# Patient Record
Sex: FEMALE | Race: WHITE | Employment: FULL TIME | URBAN - NONMETROPOLITAN AREA
[De-identification: names, ages, dates, MRNs, and addresses within clinical notes are randomized per-mention and may not be internally consistent; named-entity substitution may affect disease eponyms.]

---

## 2017-09-18 ENCOUNTER — HOSPITAL ENCOUNTER (OUTPATIENT)
Dept: LAB | Age: 42
Discharge: HOME | End: 2017-09-18
Attending: NURSE PRACTITIONER | Admitting: NURSE PRACTITIONER

## 2017-09-18 LAB
FERRITIN: 67 NG/ML (ref 15–150)
T3 FREE: 2.9 PG/ML (ref 2.3–4.2)
T4 FREE: 1.08 NG/DL (ref 0.9–1.8)
TSH HIGH SENSITIVITY: 0.01 UIU/ML (ref 0.27–4.2)
VITAMIN D 25-HYDROXY: 37.32 NG/ML

## 2017-09-19 LAB
ANTITHYROGLOBULIN AB: <0.9
ANTITHYROID MICORSOMAL: 0.4
T3 TOTAL: 92 NG/DL
T4 TOTAL: 6.02 UG/DL

## 2017-09-22 LAB — T3 REVERSE: 13.1

## 2021-08-27 ENCOUNTER — OFFICE VISIT (OUTPATIENT)
Dept: INTERNAL MEDICINE CLINIC | Age: 46
End: 2021-08-27
Payer: COMMERCIAL

## 2021-08-27 VITALS
OXYGEN SATURATION: 98 % | TEMPERATURE: 99.1 F | HEIGHT: 65 IN | BODY MASS INDEX: 36.65 KG/M2 | RESPIRATION RATE: 16 BRPM | WEIGHT: 220 LBS | HEART RATE: 89 BPM

## 2021-08-27 DIAGNOSIS — R05.9 COUGH: Primary | ICD-10-CM

## 2021-08-27 PROCEDURE — 99213 OFFICE O/P EST LOW 20 MIN: CPT | Performed by: NURSE PRACTITIONER

## 2021-08-27 NOTE — PROGRESS NOTES
8/27/2021    HPI:  Chief complaint and history of present illness as per medical assistant/nurse documented today in the Flu/COVID-19 clinic. She is being seen today through the [facility]. PCP:  Sheyla Badillo CNP    She c/o a 4 day history of productive cough of clear sputum, clear nasal drainage, sinus pressure, sore throat, loss of smell and fatigue. Reports that her  tested positive for COVID-19 yesterday. Denies history other than obesity, and hypothyroidism. Denies sob, difficulty breathing, sob, abdominal pain, n/v/d, fever, chills. No distress. MEDICATIONS:  Prior to Visit Medications    Not on File     Pulse 89   Temp 99.1 °F (37.3 °C) (Infrared)   Resp 16   Ht 5' 5\" (1.651 m)   Wt 220 lb (99.8 kg)   SpO2 98% Comment: RA  BMI 36.61 kg/m²       PHYSICAL EXAM:  Physical Exam  Vitals and nursing note reviewed. Constitutional:       General: She is not in acute distress. Appearance: Normal appearance. She is well-developed. She is not ill-appearing, toxic-appearing or diaphoretic. HENT:      Head: Normocephalic and atraumatic. Right Ear: Tympanic membrane, ear canal and external ear normal.      Left Ear: Tympanic membrane, ear canal and external ear normal.      Nose: Congestion present. Mouth/Throat:      Mouth: Mucous membranes are moist.      Pharynx: No oropharyngeal exudate or posterior oropharyngeal erythema. Eyes:      Extraocular Movements: Extraocular movements intact. Conjunctiva/sclera: Conjunctivae normal.      Pupils: Pupils are equal, round, and reactive to light. Neck:      Thyroid: No thyroid mass or thyromegaly. Trachea: Trachea normal.   Cardiovascular:      Rate and Rhythm: Normal rate and regular rhythm. Pulses: Normal pulses. Heart sounds: S1 normal and S2 normal.   Pulmonary:      Effort: Pulmonary effort is normal. No accessory muscle usage or respiratory distress. Breath sounds: Normal breath sounds.  No stridor. No wheezing, rhonchi or rales. Chest:      Chest wall: No tenderness. Abdominal:      General: Bowel sounds are normal.      Palpations: Abdomen is soft. Musculoskeletal:         General: Normal range of motion. Right shoulder: No swelling, deformity, tenderness, bony tenderness or crepitus. Normal range of motion. Cervical back: Full passive range of motion without pain, normal range of motion and neck supple. No rigidity. No muscular tenderness. Lymphadenopathy:      Cervical: No cervical adenopathy. Skin:     General: Skin is warm and dry. Coloration: Skin is not pale. Findings: No erythema or rash. Nails: There is no clubbing. Neurological:      Mental Status: She is alert and oriented to person, place, and time. Psychiatric:         Mood and Affect: Mood normal.         Speech: Speech normal.         Behavior: Behavior normal.         Thought Content: Thought content normal.         Judgment: Judgment normal.         ASSESSMENT/PLAN:    1. Cough  Discussed that she would be a candidate of monoclonal antibodies due to her history of obesity and education provided in verbally and in writing. BMI 36.61. She is considering this outpatient therapy if positive. - Encourage clear fluids without caffeine to ensure hydration.  - Smaller, more frequent meals    - Saline nasal spray, cool mist humidifier, prop head at night for congestion.   - Tylenol as needed for fever, pain. - Counseled on signs of increased work of breathing to seek emergency treatment  - Follow-up with PCP as needed. - Covid-19 Ambulatory; Future  - Covid-19 Ambulatory      FOLLOW-UP:  Return if symptoms worsen or fail to improve.     In addition to other information, the printed after visit summary provided to the patient includes:  [x] COVID-19 Self care instructions  [x] COVID-19 General patient information

## 2021-08-27 NOTE — PATIENT INSTRUCTIONS
Your COVID 19 test can take 1-5 days for the results to come back. We ask that you make a Mychart page and view your test results this way. You will need to Self quarantine until you know your results. Increase fluids and rest  Saline nasal spray as needed for nasal congestion  Warm salt gargles as needed for throat discomfort  Monitor temperature twice a day  Tylenol as needed for fevers and/or discomfort. Big deep breaths periodically throughout the day  Regular Mucinex over the counter as needed for chest congestion  If symptoms worsen -Go to the ER. Follow up with your primary care provider      To Whom it May Concern:    Vignesh Hall was tested for COVID-19 8/27/2021. He/she must stay home until test results are back. If test is positive, he/she must quarantine for a total of 10 days starting from day one of symptom onset. He/she must also be fever-free for 24 hours at that time, and also have improvement in symptoms. We do not recommend retesting as patients may continue to test positive for months even though no longer contagious. It is suggested you call [address] or [address] with any questions regarding quarantine timeframe/return to work/school details. Steps to help prevent the spread of COVID-19 if you are sick  SOURCE - https://naomy-john.info/. html     Stay home except to get medical care   ; Stay home: People who are mildly ill with COVID-19 are able to isolate at home during their illness. You should restrict activities outside your home, except for getting medical care.   ; Avoid public areas: Do not go to work, school, or public areas.   ; Avoid public transportation: Avoid using public transportation, ride-sharing, or taxis.  ; Separate yourself from other people and animals in your home   ; Stay away from others: As much as possible, you should stay in a specific room and away from other people in your home.  Also, you should use a separate bathroom, if available.   ; Limit contact with pets & animals: You should restrict contact with pets and other animals while you are sick with COVID-19, just like you would around other people. Although there have not been reports of pets or other animals becoming sick with COVID-19, it is still recommended that people sick with COVID-19 limit contact with animals until more information is known about the virus. ; When possible, have another member of your household care for your animals while you are sick. If you are sick with COVID-19, avoid contact with your pet, including petting, snuggling, being kissed or licked, and sharing food. If you must care for your pet or be around animals while you are sick, wash your hands before and after you interact with pets and wear a facemask. See COVID-19 and Animals for more information. Other considerations   The ill person should eat/be fed in their room if possible. Non-disposable  items used should be handled with gloves and washed with hot water or in a . Clean hands after handling used  items.  If possible, dedicate a lined trash can for the ill person. Use gloves when removing garbage bags, handling, and disposing of trash. Wash hands after handling or disposing of trash.  Consider consulting with your local health department about trash disposal guidance if available. Information for Household Members and Caregivers of Someone who is Sick   Call ahead before visiting your doctor   Call ahead: If you have a medical appointment, call the healthcare provider and tell them that you have or may have COVID-19. This will help the healthcare provider's office take steps to keep other people from getting infected or exposed. Wear a facemask if you are sick   ;  If you are sick: You should wear a facemask when you are around other people (e.g., sharing a room or vehicle) or pets and before you enter a healthcare provider's office. ; If you are caring for others: If the person who is sick is not able to wear a facemask (for example, because it causes trouble breathing), then people who live with the person who is sick should not stay in the same room with them, or they should wear a facemask if they enter a room with the person who is sick. Cover your coughs and sneezes   ; Cover: Cover your mouth and nose with a tissue when you cough or sneeze.   ; Dispose: Throw used tissues in a lined trash can.   ; Wash hands: Immediately wash your hands with soap and water for at least 20 seconds or, if soap and water are not available, clean your hands with an alcohol-based hand  that contains at least 60% alcohol. Clean your hands often   ; Wash hands: Wash your hands often with soap and water for at least 20 seconds, especially after blowing your nose, coughing, or sneezing; going to the bathroom; and before eating or preparing food.   ; Hand : If soap and water are not readily available, use an alcohol-based hand  with at least 60% alcohol, covering all surfaces of your hands and rubbing them together until they feel dry.   ; Soap and water: Soap and water are the best option if hands are visibly dirty.   ; Avoid touching: Avoid touching your eyes, nose, and mouth with unwashed hands. Handwashing Tips   ; Wet your hands with clean, running water (warm or cold), turn off the tap, and apply soap.  ; Lather your hands by rubbing them together with the soap. Lather the backs of your hands, between your fingers, and under your nails. ; Scrub your hands for at least 20 seconds. Need a timer? Hum the Cavalier from beginning to end twice.  ; Rinse your hands well under clean, running water.  ; Dry your hands using a clean towel or air dry them. Avoid sharing personal household items   ; Do not share:  You should not share dishes, drinking glasses, cups, eating utensils, towels, or bedding with other people or pets in your home.   ; Wash thoroughly after use: After using these items, they should be washed thoroughly with soap and water. Clean all high-touch surfaces everyday   ; Clean and disinfect: Practice routine cleaning of high touch surfaces.  ; High touch surfaces include counters, tabletops, doorknobs, bathroom fixtures, toilets, phones, keyboards, tablets, and bedside tables.  ; Disinfect areas with bodily fluids: Also, clean any surfaces that may have blood, stool, or body fluids on them.   ; Household : Use a household cleaning spray or wipe, according to the label instructions. Labels contain instructions for safe and effective use of the cleaning product including precautions you should take when applying the product, such as wearing gloves and making sure you have good ventilation during use of the product. Monitor your symptoms   Seek medical attention: Seek prompt medical attention if your illness is worsening     (e.g., difficulty breathing).   ; Call your doctor: Before seeking care, call your healthcare provider and tell them that you have, or are being evaluated for, COVID-19.   ; Wear a facemask when sick: Put on a facemask before you enter the facility. These steps will help the healthcare provider's office to keep other people in the office or waiting room from getting infected or exposed. ; Alert health department: Ask your healthcare provider to call the local or state health department. Persons who are placed under active monitoring or facilitated self-monitoring should follow instructions provided by their local health department or occupational health professionals, as appropriate.  ; Call 911 if you have a medical emergency: If you have a medical emergency and need to call 911, notify the dispatch personnel that you have, or are being evaluated for COVID-19. If possible, put on a facemask before emergency medical services arrive.

## 2021-08-27 NOTE — PROGRESS NOTES
8/27/21  Homer Holt  1975    FLU/COVID-19 CLINIC EVALUATION    HPI SYMPTOMS:    Employer:    [] Fevers  [] Chills  [x] Cough  [] Coughing up blood  [] Chest Congestion  [x] Nasal Congestion  [] Feeling short of breath  [] Sometimes  [] Frequently  [] All the time  [] Chest pain  [] Headaches  []Tolerable  [] Severe  [x] Sore throat  [] Muscle aches  [] Nausea  [] Vomiting  []Unable to keep fluids down  [] Diarrhea  []Severe    [x] OTHER SYMPTOMS:      Symptom Duration:   [] 1  [] 2   [] 3   [x] 4    [] 5   [] 6   [] 7   [] 8   [] 9   [] 10   [] 11   [] 12   [] 13   [] 14   [] Longer than 14 days    Symptom course:   [] Worsening     [] Stable     [x] Improving    RISK FACTORS:    [] Pregnant or possibly pregnant  [] Age over 61  [] Diabetes  [] Heart disease  [] Asthma  [] COPD/Other chronic lung diseases  [] Active Cancer  [] On Chemotherapy  [] Taking oral steroids  [] History Lymphoma/Leukemia  [x] Close contact with a lab confirmed COVID-19 patient within 14 days of symptom onset  [] History of travel from affected geographical areas within 14 days of symptom onset       VITALS:  There were no vitals filed for this visit. TESTS:    POCT FLU:  [] Positive     []Negative    ASSESSMENT:    [] Flu  [] Possible COVID-19  [] Strep    PLAN:    [] Discharge home with written instructions for:  [] Flu management  [] Possible COVID-19 infection with self-quarantine and management of symptoms  [] Follow-up with primary care physician or emergency department if worsens  [] Evaluation per physician/NP/PA in clinic  [] Sent to ER       An  electronic signature was used to authenticate this note.      --Max Angel on 8/27/2021 at 10:41 AM

## 2021-08-28 LAB — SARS-COV-2: DETECTED

## 2021-08-29 NOTE — RESULT ENCOUNTER NOTE
I have called the patient and reviewed her positive covid result with her. Discussed the outpatient monoclonal antibody treatment with her and she declines at this time. She feels that she is still having mild symptoms. Education provided that she is at risk for sever COVID based on her BMI, and that if she changes her mind that she can contact the office and we will place the order. She verbalizes understanding.

## 2022-01-03 ENCOUNTER — OFFICE VISIT (OUTPATIENT)
Dept: INTERNAL MEDICINE CLINIC | Age: 47
End: 2022-01-03
Payer: COMMERCIAL

## 2022-01-03 VITALS — HEART RATE: 98 BPM | OXYGEN SATURATION: 97 % | TEMPERATURE: 97.7 F

## 2022-01-03 DIAGNOSIS — J01.00 ACUTE NON-RECURRENT MAXILLARY SINUSITIS: Primary | ICD-10-CM

## 2022-01-03 PROCEDURE — 99213 OFFICE O/P EST LOW 20 MIN: CPT | Performed by: NURSE PRACTITIONER

## 2022-01-03 RX ORDER — AMOXICILLIN 875 MG/1
875 TABLET, COATED ORAL 2 TIMES DAILY
Qty: 20 TABLET | Refills: 0 | Status: SHIPPED | OUTPATIENT
Start: 2022-01-03 | End: 2022-01-13

## 2022-01-03 RX ORDER — METHYLPREDNISOLONE 4 MG/1
TABLET ORAL
Qty: 1 KIT | Refills: 0 | Status: SHIPPED | OUTPATIENT
Start: 2022-01-03 | End: 2022-01-09

## 2022-01-03 NOTE — PATIENT INSTRUCTIONS
Your COVID 19 test can take 1-5 days for the results to come back. We ask that you make a Mychart page and view your test results this way. You will need to Self quarantine until you know your results. Increase fluids and rest  Saline nasal spray as needed for nasal congestion  Warm salt gargles as needed for throat discomfort  Monitor temperature twice a day  Tylenol as needed for fevers and/or discomfort. Big deep breaths periodically throughout the day  Regular Mucinex over the counter as needed for chest congestion  If symptoms worsen -Go to the ER. Follow up with your primary care provider      To Whom it May Concern:    Lisa Verma was tested for COVID-19 1/3/2022. He/she must stay home until test results are back. If test is positive, he/she must quarantine for a total of 10 days starting from day one of symptom onset. He/she must also be fever-free for 24 hours at that time, and also have improvement in symptoms. CDC guidelines, however, are often changing and quarantine guidelines may be based on type of occupation. We do not recommend retesting as patients may continue to test positive for months even though no longer contagious. It is suggested you call [address] or [address] [place] [address] with any questions regarding quarantine timeframe/return to work/school details.

## 2022-01-03 NOTE — PROGRESS NOTES
Subjective:       Anibal Chin is a 55 y.o. female who presents for evaluation of possible sinus infection. Symptoms include bilateral ear pressure/pain, congestion, nasal congestion, productive cough with  green colored sputum, sinus pressure and tooth pain with no fever, chills, night sweats or weight loss. Onset of symptoms was 2 weeks ago, gradually worsening since that time. She is drinking plenty of fluids. Past history is significant for no history of pneumonia or bronchitis. Patient is a non-smoker. Patient's medications, allergies, past medical, surgical, social and family histories were reviewed and updated as appropriate. Review of Systems  Pertinent items are noted in HPI. Objective:      Pulse 98   Temp 97.7 °F (36.5 °C)   SpO2 97%   General appearance: alert, appears stated age and cooperative  Head: Normocephalic, without obvious abnormality, atraumatic  Ears: normal TM and external ear canal right ear and normal TM and external ear canal left ear bulging of bilateral TM  Throat: lips, mucosa, and tongue normal; teeth and gums normal  Lungs: clear to auscultation bilaterally      Assessment:      Acute bacterial sinusitis      Plan:      1. Claritin D  2. Amoxicillin  3. Nasal saline rinses as needed for congestion. 4. Follow-up with PCP in 1 week if symptoms worsen or persist.       1. Acute non-recurrent maxillary sinusitis  Pt having green sputum production nasally and when coughing. Teeth hurt, ear pressure and post nasal drip. - amoxicillin (AMOXIL) 875 MG tablet; Take 1 tablet by mouth 2 times daily for 10 days  Dispense: 20 tablet; Refill: 0  - methylPREDNISolone (MEDROL DOSEPACK) 4 MG tablet; Take by mouth. Dispense: 1 kit;  Refill: 0

## 2022-01-03 NOTE — PROGRESS NOTES
1/3/22  Erica Holt  1975    FLU/COVID-19 CLINIC EVALUATION    HPI SYMPTOMS:    Employer:    [] Fevers  [] Chills  [x] Cough  [] Coughing up blood  [x] Chest Congestion  [x] Nasal Congestion  [] Feeling short of breath  [] Sometimes  [] Frequently  [] All the time  [] Chest pain  [] Headaches  []Tolerable  [] Severe  [x] Sore throat  [] Muscle aches  [] Nausea  [] Vomiting  []Unable to keep fluids down  [] Diarrhea  []Severe    [x] OTHER SYMPTOMS:  Ear paiin  Symptom Duration:   [] 1  [] 2   [] 3   [] 4    [] 5   [] 6   [] 7   [] 8   [] 9   [] 10   [] 11   [] 12   [] 13   [] 14   [x] Longer than 14 days    Symptom course:   [x] Worsening     [] Stable     [] Improving    RISK FACTORS:    [] Pregnant or possibly pregnant  [] Age over 61  [] Diabetes  [] Heart disease  [] Asthma  [] COPD/Other chronic lung diseases  [] Active Cancer  [] On Chemotherapy  [] Taking oral steroids  [] History Lymphoma/Leukemia  [] Close contact with a lab confirmed COVID-19 patient within 14 days of symptom onset  [] History of travel from affected geographical areas within 14 days of symptom onset       VITALS:  There were no vitals filed for this visit. TESTS:    POCT FLU:  [] Positive     []Negative    ASSESSMENT:    [] Flu  [] Possible COVID-19  [] Strep    PLAN:    [] Discharge home with written instructions for:  [] Flu management  [] Possible COVID-19 infection with self-quarantine and management of symptoms  [] Follow-up with primary care physician or emergency department if worsens  [] Evaluation per physician/NP/PA in clinic  [] Sent to ER       An  electronic signature was used to authenticate this note.      --Nathaniel Chavarria MA on 1/3/2022 at 3:12 PM

## 2022-07-29 ENCOUNTER — OFFICE VISIT (OUTPATIENT)
Dept: INTERNAL MEDICINE CLINIC | Age: 47
End: 2022-07-29
Payer: COMMERCIAL

## 2022-07-29 ENCOUNTER — HOSPITAL ENCOUNTER (OUTPATIENT)
Age: 47
Discharge: HOME | End: 2022-07-29
Payer: COMMERCIAL

## 2022-07-29 VITALS
BODY MASS INDEX: 36.13 KG/M2 | WEIGHT: 224.8 LBS | OXYGEN SATURATION: 98 % | DIASTOLIC BLOOD PRESSURE: 62 MMHG | HEIGHT: 66 IN | HEART RATE: 101 BPM | SYSTOLIC BLOOD PRESSURE: 110 MMHG

## 2022-07-29 DIAGNOSIS — E89.0 POST-SURGICAL HYPOTHYROIDISM: Primary | ICD-10-CM

## 2022-07-29 DIAGNOSIS — E55.9 VITAMIN D DEFICIENCY: ICD-10-CM

## 2022-07-29 DIAGNOSIS — E78.5 DYSLIPIDEMIA: ICD-10-CM

## 2022-07-29 DIAGNOSIS — Z12.11 COLON CANCER SCREENING: ICD-10-CM

## 2022-07-29 DIAGNOSIS — Z72.0 TOBACCO USE: ICD-10-CM

## 2022-07-29 LAB
ALBUMIN SERPL-MCNC: 4.6 GM/DL (ref 3.4–5)
ALP BLD-CCNC: 119 IU/L (ref 40–129)
ALT SERPL-CCNC: 13 U/L (ref 10–40)
ANION GAP SERPL CALCULATED.3IONS-SCNC: 13 MMOL/L (ref 4–16)
AST SERPL-CCNC: 17 IU/L (ref 15–37)
BASOPHILS ABSOLUTE: 0.1 K/CU MM
BASOPHILS RELATIVE PERCENT: 0.8 % (ref 0–1)
BILIRUB SERPL-MCNC: 0.2 MG/DL (ref 0–1)
BUN BLDV-MCNC: 9 MG/DL (ref 6–23)
CALCIUM SERPL-MCNC: 9.1 MG/DL (ref 8.3–10.6)
CHLORIDE BLD-SCNC: 103 MMOL/L (ref 99–110)
CHOLESTEROL, FASTING: 187 MG/DL
CO2: 24 MMOL/L (ref 21–32)
CREAT SERPL-MCNC: 0.7 MG/DL (ref 0.6–1.1)
DIFFERENTIAL TYPE: ABNORMAL
EOSINOPHILS ABSOLUTE: 0.3 K/CU MM
EOSINOPHILS RELATIVE PERCENT: 2.6 % (ref 0–3)
GFR AFRICAN AMERICAN: >60 ML/MIN/1.73M2
GFR NON-AFRICAN AMERICAN: >60 ML/MIN/1.73M2
GLUCOSE FASTING: 90 MG/DL (ref 70–99)
HCT VFR BLD CALC: 43.9 % (ref 37–47)
HDLC SERPL-MCNC: 48 MG/DL
HEMOGLOBIN: 13.9 GM/DL (ref 12.5–16)
IMMATURE NEUTROPHIL %: 1.3 % (ref 0–0.43)
LDL CHOLESTEROL CALCULATED: 117 MG/DL
LYMPHOCYTES ABSOLUTE: 2 K/CU MM
LYMPHOCYTES RELATIVE PERCENT: 15.4 % (ref 24–44)
MCH RBC QN AUTO: 28.6 PG (ref 27–31)
MCHC RBC AUTO-ENTMCNC: 31.7 % (ref 32–36)
MCV RBC AUTO: 90.3 FL (ref 78–100)
MONOCYTES ABSOLUTE: 0.8 K/CU MM
MONOCYTES RELATIVE PERCENT: 6.4 % (ref 0–4)
PDW BLD-RTO: 14.3 % (ref 11.7–14.9)
PLATELET # BLD: 413 K/CU MM (ref 140–440)
PMV BLD AUTO: 10.7 FL (ref 7.5–11.1)
POTASSIUM SERPL-SCNC: 4.9 MMOL/L (ref 3.5–5.1)
RBC # BLD: 4.86 M/CU MM (ref 4.2–5.4)
SEGMENTED NEUTROPHILS ABSOLUTE COUNT: 9.5 K/CU MM
SEGMENTED NEUTROPHILS RELATIVE PERCENT: 73.5 % (ref 36–66)
SODIUM BLD-SCNC: 140 MMOL/L (ref 135–145)
T4 FREE: 0.64 NG/DL (ref 0.9–1.8)
TOTAL IMMATURE NEUTOROPHIL: 0.17 K/CU MM
TOTAL PROTEIN: 7.2 GM/DL (ref 6.4–8.2)
TRIGLYCERIDE, FASTING: 111 MG/DL
TSH HIGH SENSITIVITY: 26.08 UIU/ML (ref 0.27–4.2)
VITAMIN D 25-HYDROXY: 26.39 NG/ML
WBC # BLD: 12.9 K/CU MM (ref 4–10.5)

## 2022-07-29 PROCEDURE — 85025 COMPLETE CBC W/AUTO DIFF WBC: CPT

## 2022-07-29 PROCEDURE — 99214 OFFICE O/P EST MOD 30 MIN: CPT | Performed by: INTERNAL MEDICINE

## 2022-07-29 PROCEDURE — 80053 COMPREHEN METABOLIC PANEL: CPT

## 2022-07-29 PROCEDURE — 84443 ASSAY THYROID STIM HORMONE: CPT

## 2022-07-29 PROCEDURE — 80061 LIPID PANEL: CPT

## 2022-07-29 PROCEDURE — 84439 ASSAY OF FREE THYROXINE: CPT

## 2022-07-29 PROCEDURE — 82306 VITAMIN D 25 HYDROXY: CPT

## 2022-07-29 PROCEDURE — 36415 COLL VENOUS BLD VENIPUNCTURE: CPT

## 2022-07-29 NOTE — PROGRESS NOTES
Establishing care with a new PCP and needs to be referred to a new Endocrinologist. Has not had Thyroid medication in about 1-2 years and needs blood work done. Patient takes [facility].

## 2022-07-29 NOTE — PROGRESS NOTES
7/29/22    Addi Holt  1975    Chief Complaint   Patient presents with    New Patient       History of Present Illness:  Alexandro Molina is a 52 y.o. pleasant lady presenting today to establish care as a new patient with a chief complaint of surgical hypothyroidism. 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 She is off Synthroid due to LTFU. Has been taking OTC supplements. # Patient reports a prior physician placed her on meds for fibromyalgia due to foot pain. # Wants to quit smoking on her own.        Health maintenance:   Health Maintenance Due   Topic Date Due    COVID-19 Vaccine (1) Never done    Depression Screen  Never done    HIV screen  Never done    Hepatitis C screen  Never done    DTaP/Tdap/Td vaccine (1 - Tdap) Never done    Cervical cancer screen  Never done    Diabetes screen  Never done    Lipids  Never done    Colorectal Cancer Screen  Never done         Review of Systems:  Constitutional: no fevers, no chills, weight gain  Pain assessment: no pain  Head: no headaches  Ears: no hearing loss, no tinnitus, no vertigo  Eyes: no blurry vision, no diplopia, no dryness, no itchiness  Mouth: no oral ulcers, no dry mouth, no sore throat  Nose: no nasal congestion, no epistaxis  Cardiac: no chest pain, no palpitations, no leg swelling, no orthopnea, no PND, no syncope  Pulmonary: no dyspnea, no cough, no wheezing, no hemoptysis  GI: no nausea, no vomiting, no diarrhea, no constipation, no abdominal pain, no hematochezia  : no dysuria, no frequency, no urgency, no hematuria, no frothy urine, no dyspareunia, no pelvic pain, no vaginal bleeding, no abnormal vaginal discharge  MSK: no arthralgias, no myalgias, no early morning stiffness, no Raynaud's   Neuro: no focal neurological deficits, no seizures  Sleep: no snoring, no daytime somnolence   Psych: no depression, no anxiety, no suicidal ideation      Physical Exam:  VITALS:   /62 (Site: Left Upper Arm, Position: Sitting, Cuff Size: Large Adult)   Pulse (!) 101   Ht 5' 6\" (1.676 m)   Wt 224 lb 12.8 oz (102 kg)   SpO2 98%   BMI 36.28 kg/m²     PHYSICAL EXAMINATION:  General: alert, awake, and oriented to time, place, person, and situation. Not in acute distress   Skin:  no suspicious rashes, no jaundice  Head: normocephalic/atraumatic  Eyes: anicteric sclera, well-injected conjunctiva. Pupils are equally round and reactive to light. Extraocular movements are intact   Nose: no septal deviation evident  Sinuses: no sinus tenderness  Ears: external ears normal  Neck: supple, no cervical lymphadenopathy, thyroid symmetric and not enlarged, no bruits   Heart: regular rate and rhythm, regular S1/S2, no S3/S4, no audible murmurs, no audible friction rub  Lungs: clear to auscultation bilaterally, no audible crackles, no audible wheezes, no audible rhonchi    Abdomen: normal bowel sounds, soft abdomen, non-tender, no palpable masses  Extremities: no edema, warm, no cyanosis, no clubbing. Good capillary refill   MSK: no tenderness across spinous processes, full ROM in all 4 extremities.  No joint swelling or tenderness   Peripheral vascular: 2+ pulses symmetric (radial)  Neuro: gait normal, CN II-XII intact, motor power 5/5 in all 4 extremities, sensation intact and symmetric    Labs   I have personally reviewed labs, and discussed pertinent findings with patient on this date 7/29/2022     Imaging   I have personally reviewed imaging, and discussed pertinent findings with patient on this date 7/29/2022     Other notes  I have personally reviewed other notes, and discussed pertinent findings with patient on this date 7/29/2022 Assessment/Plan:     1. 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 Dyslipidemia  - CBC with Auto Differential; Future  - Comprehensive Metabolic Panel; Future  - Lipid, Fasting; Future    3. Vitamin D deficiency  - Vitamin D 25 Hydroxy; Future    4. Tobacco use  Smoking cessation strongly encouraged  Patient wants to quit cold turkey and declined my help at the moment    5. Colon cancer screening  - Fecal DNA Colorectal cancer screening (Cologuard)      Care discussed with patient and questions answered. Patient verbalizes understanding and agrees with plan. Discussed with patient the importance of continuity of care. I encouraged patient to schedule next appointment within 2 months with me. Patient prefers to be reached by Phone call at [phone] for future medical correspondence. Encouraged to activate [employer]. I reviewed and reconciled the medications this visit. I reviewed and updated the past medical, surgical, social, and family history during this visit. After visit summary provided.        Rd Erickson MD  Internal Medicine  7/29/2022   11:18 AM

## 2022-08-01 ENCOUNTER — PATIENT MESSAGE (OUTPATIENT)
Dept: INTERNAL MEDICINE CLINIC | Age: 47
End: 2022-08-01

## 2022-08-01 DIAGNOSIS — E89.0 POST-SURGICAL HYPOTHYROIDISM: Primary | ICD-10-CM

## 2022-08-04 RX ORDER — LEVOTHYROXINE SODIUM 0.05 MG/1
50 TABLET ORAL DAILY
Qty: 30 TABLET | Refills: 1 | Status: SHIPPED | OUTPATIENT
Start: 2022-08-04 | End: 2022-09-29

## 2022-08-04 NOTE — TELEPHONE ENCOUNTER
From: Rosana Holt  To: Dr. Prado Link  Sent: 8/1/2022 6:29 PM EDT  Subject: Do I have to wait     Do I have to wait 2 months to find out what my results mean? As far as my thyroid I know it's showing on the hyper end. What does this mean going forward? I was hoping there would be something telling me what I do next on this my charts. I'm use to getting a call about my results and where we go from here. Do I have to wait to see endo about my medicine? I'm new to this system so kati how this goes as far as results and medicine.

## 2022-08-10 LAB — NONINV COLON CA DNA+OCC BLD SCRN STL QL: NORMAL

## 2022-09-13 ENCOUNTER — PATIENT MESSAGE (OUTPATIENT)
Dept: INTERNAL MEDICINE CLINIC | Age: 47
End: 2022-09-13

## 2022-09-13 DIAGNOSIS — E89.0 POST-SURGICAL HYPOTHYROIDISM: Primary | ICD-10-CM

## 2022-09-14 NOTE — TELEPHONE ENCOUNTER
From: Brandyn Holt  To: Dr. Ronnell Bell  Sent: 9/13/2022 8:40 PM EDT  Subject: Endocrinologist referral     I know you sent me a referral to [facility] but I would like to be closer to home. I would like to be referred to Dr. Sahra Cannon in [place]. She is in network for my insurance and she is closer to home. My work hours are back amd forth with overtime right now and this would be easier to manage.

## 2022-09-23 LAB — NONINV COLON CA DNA+OCC BLD SCRN STL QL: NEGATIVE

## 2022-09-26 ENCOUNTER — HOSPITAL ENCOUNTER (OUTPATIENT)
Age: 47
Discharge: HOME | End: 2022-09-26
Payer: COMMERCIAL

## 2022-09-26 LAB
BASOPHILS ABSOLUTE: 0.1 K/CU MM
BASOPHILS RELATIVE PERCENT: 0.8 % (ref 0–1)
DIFFERENTIAL TYPE: ABNORMAL
EOSINOPHILS ABSOLUTE: 0.4 K/CU MM
EOSINOPHILS RELATIVE PERCENT: 3.2 % (ref 0–3)
HCT VFR BLD CALC: 39.8 % (ref 37–47)
HEMOGLOBIN: 12.6 GM/DL (ref 12.5–16)
IMMATURE NEUTROPHIL %: 0.5 % (ref 0–0.43)
LYMPHOCYTES ABSOLUTE: 2.2 K/CU MM
LYMPHOCYTES RELATIVE PERCENT: 19.6 % (ref 24–44)
MCH RBC QN AUTO: 29 PG (ref 27–31)
MCHC RBC AUTO-ENTMCNC: 31.7 % (ref 32–36)
MCV RBC AUTO: 91.7 FL (ref 78–100)
MONOCYTES ABSOLUTE: 0.6 K/CU MM
MONOCYTES RELATIVE PERCENT: 5.6 % (ref 0–4)
PDW BLD-RTO: 14.2 % (ref 11.7–14.9)
PLATELET # BLD: 391 K/CU MM (ref 140–440)
PMV BLD AUTO: 10.2 FL (ref 7.5–11.1)
RBC # BLD: 4.34 M/CU MM (ref 4.2–5.4)
SEGMENTED NEUTROPHILS ABSOLUTE COUNT: 7.7 K/CU MM
SEGMENTED NEUTROPHILS RELATIVE PERCENT: 70.3 % (ref 36–66)
TOTAL IMMATURE NEUTOROPHIL: 0.05 K/CU MM
TSH HIGH SENSITIVITY: 40.1 UIU/ML (ref 0.27–4.2)
WBC # BLD: 11 K/CU MM (ref 4–10.5)

## 2022-09-26 PROCEDURE — 84439 ASSAY OF FREE THYROXINE: CPT

## 2022-09-26 PROCEDURE — 36415 COLL VENOUS BLD VENIPUNCTURE: CPT

## 2022-09-26 PROCEDURE — 85025 COMPLETE CBC W/AUTO DIFF WBC: CPT

## 2022-09-26 PROCEDURE — 84443 ASSAY THYROID STIM HORMONE: CPT

## 2022-09-27 LAB — T4 FREE: 0.83 NG/DL (ref 0.9–1.8)

## 2022-09-29 ENCOUNTER — OFFICE VISIT (OUTPATIENT)
Dept: INTERNAL MEDICINE CLINIC | Age: 47
End: 2022-09-29
Payer: COMMERCIAL

## 2022-09-29 VITALS
DIASTOLIC BLOOD PRESSURE: 86 MMHG | HEART RATE: 91 BPM | OXYGEN SATURATION: 99 % | SYSTOLIC BLOOD PRESSURE: 128 MMHG | BODY MASS INDEX: 36.07 KG/M2 | RESPIRATION RATE: 16 BRPM | WEIGHT: 224.4 LBS | HEIGHT: 66 IN

## 2022-09-29 DIAGNOSIS — E89.0 POST-SURGICAL HYPOTHYROIDISM: Primary | ICD-10-CM

## 2022-09-29 PROCEDURE — 99214 OFFICE O/P EST MOD 30 MIN: CPT | Performed by: INTERNAL MEDICINE

## 2022-09-29 RX ORDER — FLUTICASONE PROPIONATE 50 MCG
SPRAY, SUSPENSION (ML) NASAL
COMMUNITY
Start: 2022-06-29

## 2022-09-29 RX ORDER — LEVOTHYROXINE SODIUM 88 MCG
88 TABLET ORAL DAILY
Qty: 30 TABLET | Refills: 1 | Status: SHIPPED | OUTPATIENT
Start: 2022-09-29

## 2022-09-29 SDOH — ECONOMIC STABILITY: FOOD INSECURITY: WITHIN THE PAST 12 MONTHS, THE FOOD YOU BOUGHT JUST DIDN'T LAST AND YOU DIDN'T HAVE MONEY TO GET MORE.: NEVER TRUE

## 2022-09-29 SDOH — ECONOMIC STABILITY: FOOD INSECURITY: WITHIN THE PAST 12 MONTHS, YOU WORRIED THAT YOUR FOOD WOULD RUN OUT BEFORE YOU GOT MONEY TO BUY MORE.: NEVER TRUE

## 2022-09-29 ASSESSMENT — SOCIAL DETERMINANTS OF HEALTH (SDOH): HOW HARD IS IT FOR YOU TO PAY FOR THE VERY BASICS LIKE FOOD, HOUSING, MEDICAL CARE, AND HEATING?: NOT HARD AT ALL

## 2022-09-29 NOTE — PROGRESS NOTES
9/29/22    Nellyivette Holt  1975    Chief Complaint   Patient presents with    Follow-up     2 month f/u       History of Present Illness:  Amena Junior is a 52 y.o. pleasant lady presenting today with a chief complaint of post-surgical hypothyroidism. 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 She was off Synthroid due to LTFU. Had been taking OTC supplements. Started back on Synthroid Aug 2022. Referred to Endocrinology. # WBC count elevated but improving (July to Sept 2022). # Patient reports a prior physician placed her on meds for fibromyalgia due to foot pain. # Negative Cologuard Sept 2022. # Wants to quit smoking on her own.        Health maintenance:   Health Maintenance Due   Topic Date Due    COVID-19 Vaccine (1) Never done    Pneumococcal 0-64 years Vaccine (1 - PCV) Never done    Depression Screen  Never done    HIV screen  Never done    Hepatitis C screen  Never done    DTaP/Tdap/Td vaccine (1 - Tdap) Never done    Cervical cancer screen  Never done    Flu vaccine (1) Never done         Review of Systems:  Constitutional: no fevers, no chills, weight gain  Pain assessment: no pain  Head: no headaches  Ears: no hearing loss, no tinnitus, no vertigo  Eyes: no blurry vision, no diplopia, no dryness, no itchiness  Mouth: no oral ulcers, no dry mouth, no sore throat  Nose: no nasal congestion, no epistaxis  Cardiac: no chest pain, no palpitations, no leg swelling, no orthopnea, no PND, no syncope  Pulmonary: no dyspnea, no cough, no wheezing, no hemoptysis  GI: no nausea, no vomiting, no diarrhea, no constipation, no abdominal pain, no hematochezia  : no dysuria, no frequency, no urgency, no hematuria, no frothy urine, no dyspareunia, no pelvic pain, no vaginal bleeding, no abnormal vaginal discharge  MSK: no arthralgias, no myalgias, no early morning stiffness, no Raynaud's   Neuro: no focal neurological deficits, no seizures  Sleep: no snoring, no daytime somnolence   Psych: no depression, no anxiety, no suicidal ideation      Physical Exam:  VITALS:   /86   Pulse 91   Resp 16   Ht 5' 6\" (1.676 m)   Wt 224 lb 6.4 oz (101.8 kg)   SpO2 99%   BMI 36.22 kg/m²     PHYSICAL EXAMINATION:  General: alert, awake, and oriented to time, place, person, and situation. Not in acute distress   Skin:  no suspicious rashes, no jaundice  Head: normocephalic/atraumatic  Eyes: anicteric sclera, well-injected conjunctiva. Pupils are equally round and reactive to light. Extraocular movements are intact   Nose: no septal deviation evident  Sinuses: no sinus tenderness  Ears: external ears normal  Neck: supple, no cervical lymphadenopathy  Heart: regular rate and rhythm, regular S1/S2, no S3/S4, no audible murmurs, no audible friction rub  Lungs: clear to auscultation bilaterally, no audible crackles, no audible wheezes, no audible rhonchi    Abdomen: normal bowel sounds, soft abdomen, non-tender, no palpable masses  Extremities: no edema, warm, no cyanosis, no clubbing. Good capillary refill   MSK: no tenderness across spinous processes, full ROM in all 4 extremities.  No joint swelling or tenderness   Peripheral vascular: 2+ pulses symmetric (radial)  Neuro: gait normal, CN II-XII intact, motor power 5/5 in all 4 extremities, sensation intact and symmetric    Labs   I have personally reviewed labs, and discussed pertinent findings with patient on this date 9/29/2022     Imaging   I have personally reviewed imaging, and discussed pertinent findings with patient on this date 9/29/2022     Other notes  I have personally reviewed other notes, and discussed pertinent findings with patient on this date 9/29/2022       Assessment/Plan:     1. 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 Patient verbalizes understanding and agrees with plan. Discussed with patient the importance of continuity of care. I encouraged patient to schedule next appointment within 7 weeks with me. Patient prefers to be reached by Phone call at [phone] for future medical correspondence. Encouraged to activate [employer]. I reviewed and reconciled the medications this visit. I reviewed and updated the past medical, surgical, social, and family history during this visit. After visit summary provided.        Pamela Sanz MD  Internal Medicine  9/29/2022   9:03 AM

## 2022-11-22 ENCOUNTER — HOSPITAL ENCOUNTER (OUTPATIENT)
Age: 47
Discharge: HOME | End: 2022-11-22
Payer: COMMERCIAL

## 2022-11-22 LAB
T4 FREE: 1.21 NG/DL (ref 0.9–1.8)
TSH HIGH SENSITIVITY: 8.09 UIU/ML (ref 0.27–4.2)

## 2022-11-22 PROCEDURE — 84439 ASSAY OF FREE THYROXINE: CPT

## 2022-11-22 PROCEDURE — 36415 COLL VENOUS BLD VENIPUNCTURE: CPT

## 2022-11-22 PROCEDURE — 84443 ASSAY THYROID STIM HORMONE: CPT

## 2022-11-28 ENCOUNTER — TELEPHONE (OUTPATIENT)
Dept: INTERNAL MEDICINE CLINIC | Age: 47
End: 2022-11-28

## 2023-02-21 ENCOUNTER — OFFICE VISIT (OUTPATIENT)
Dept: INTERNAL MEDICINE CLINIC | Age: 48
End: 2023-02-21
Payer: COMMERCIAL

## 2023-02-21 VITALS
HEART RATE: 99 BPM | SYSTOLIC BLOOD PRESSURE: 118 MMHG | OXYGEN SATURATION: 98 % | RESPIRATION RATE: 18 BRPM | DIASTOLIC BLOOD PRESSURE: 84 MMHG | WEIGHT: 233 LBS | BODY MASS INDEX: 37.45 KG/M2 | HEIGHT: 66 IN

## 2023-02-21 DIAGNOSIS — E89.0 POST-SURGICAL HYPOTHYROIDISM: Primary | ICD-10-CM

## 2023-02-21 DIAGNOSIS — Z72.0 TOBACCO USE: ICD-10-CM

## 2023-02-21 DIAGNOSIS — H00.015 HORDEOLUM EXTERNUM OF LEFT LOWER EYELID: ICD-10-CM

## 2023-02-21 PROCEDURE — 99214 OFFICE O/P EST MOD 30 MIN: CPT | Performed by: INTERNAL MEDICINE

## 2023-02-21 RX ORDER — LEVOTHYROXINE SODIUM 112 MCG
TABLET ORAL
COMMUNITY
Start: 2023-01-10

## 2023-02-21 SDOH — ECONOMIC STABILITY: INCOME INSECURITY: HOW HARD IS IT FOR YOU TO PAY FOR THE VERY BASICS LIKE FOOD, HOUSING, MEDICAL CARE, AND HEATING?: NOT HARD AT ALL

## 2023-02-21 SDOH — ECONOMIC STABILITY: FOOD INSECURITY: WITHIN THE PAST 12 MONTHS, THE FOOD YOU BOUGHT JUST DIDN'T LAST AND YOU DIDN'T HAVE MONEY TO GET MORE.: NEVER TRUE

## 2023-02-21 SDOH — ECONOMIC STABILITY: HOUSING INSECURITY
IN THE LAST 12 MONTHS, WAS THERE A TIME WHEN YOU DID NOT HAVE A STEADY PLACE TO SLEEP OR SLEPT IN A SHELTER (INCLUDING NOW)?: NO

## 2023-02-21 SDOH — ECONOMIC STABILITY: FOOD INSECURITY: WITHIN THE PAST 12 MONTHS, YOU WORRIED THAT YOUR FOOD WOULD RUN OUT BEFORE YOU GOT MONEY TO BUY MORE.: NEVER TRUE

## 2023-02-21 ASSESSMENT — PATIENT HEALTH QUESTIONNAIRE - PHQ9
SUM OF ALL RESPONSES TO PHQ QUESTIONS 1-9: 0
1. LITTLE INTEREST OR PLEASURE IN DOING THINGS: 0
SUM OF ALL RESPONSES TO PHQ QUESTIONS 1-9: 0
2. FEELING DOWN, DEPRESSED OR HOPELESS: 0
SUM OF ALL RESPONSES TO PHQ QUESTIONS 1-9: 0
SUM OF ALL RESPONSES TO PHQ9 QUESTIONS 1 & 2: 0
SUM OF ALL RESPONSES TO PHQ QUESTIONS 1-9: 0

## 2023-02-21 NOTE — PROGRESS NOTES
2/21/23    Ivana Holt  1975    Chief Complaint   Patient presents with    Follow-up     Lab follow up        History of Present Illness:  Wilbur De Anda is a 52 y.o. pleasant lady presenting today with a chief complaint of surgical hypothyroidism. 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 She was off Synthroid due to LTFU. Had been taking OTC supplements. Started back on Synthroid Aug 2022. Referred to Endocrinology Dr. García Rosado. She is now established with her. # WBC count elevated but improving (July to Sept 2022). # Patient reports a prior physician placed her on meds for fibromyalgia due to foot pain. # Negative Cologuard Sept 2022. # Wants to quit smoking on her own. Did not tolerate Chantix.        Health maintenance:   Health Maintenance Due   Topic Date Due    COVID-19 Vaccine (1) Never done    Pneumococcal 0-64 years Vaccine (1 - PCV) Never done    HIV screen  Never done    Hepatitis C screen  Never done    DTaP/Tdap/Td vaccine (1 - Tdap) Never done    Cervical cancer screen  Never done    Flu vaccine (1) Never done         Review of Systems:  Constitutional: no fevers, no chills  Ears: no hearing loss, no tinnitus, no vertigo  Eyes: no blurry vision, no diplopia, no dryness, no itchiness  Mouth: no oral ulcers, no dry mouth, no sore throat  Nose: no nasal congestion, no epistaxis  Cardiac: no chest pain, no palpitations, no leg swelling, no orthopnea, no PND, no syncope  Pulmonary: no dyspnea, no cough, no wheezing, no hemoptysis  GI: no nausea, no vomiting, no diarrhea, no constipation, no abdominal pain, no hematochezia  : no dysuria, no frequency, no urgency, no hematuria, no frothy urine, no dyspareunia, no pelvic pain, no vaginal bleeding, no abnormal vaginal discharge  MSK: no Raynaud's   Neuro: no focal neurological deficits, no seizures  Sleep: no snoring, no daytime somnolence   Psych: no depression, no anxiety, no suicidal ideation      Physical Exam:  VITALS:   /84 (Site: Left Upper Arm)   Pulse 99   Resp 18   Ht 5' 6\" (1.676 m)   Wt 233 lb (105.7 kg)   LMP  (LMP Unknown)   SpO2 98%   BMI 37.61 kg/m²     PHYSICAL EXAMINATION:  General: alert, awake, and oriented to time, place, person, and situation. Not in acute distress   Skin:  no suspicious rashes, no jaundice  Head: normocephalic/atraumatic  Eyes: L lower eyelid stye   Nose: no septal deviation evident  Sinuses: no sinus tenderness  Ears: external ears normal  Neck: supple, no cervical lymphadenopathy, thyroid symmetric and not enlarged, no bruits   Heart: regular rate and rhythm, regular S1/S2, no S3/S4, no audible murmurs, no audible friction rub  Lungs: clear to auscultation bilaterally, no audible crackles, no audible wheezes, no audible rhonchi    Abdomen: normal bowel sounds, soft abdomen, non-tender, no palpable masses  Extremities: no edema, warm, no cyanosis, no clubbing. Good capillary refill   MSK: no tenderness across spinous processes, full ROM in all 4 extremities.  No joint swelling or tenderness   Peripheral vascular: 2+ pulses symmetric (radial)  Neuro: gait normal, CN II-XII intact, motor power 5/5 in all 4 extremities, sensation intact and symmetric    Labs   I have personally reviewed labs, and discussed pertinent findings with patient on this date 2/21/2023     Imaging   I have personally reviewed imaging, and discussed pertinent findings with patient on this date 2/21/2023     Other notes  I have personally reviewed other notes, and discussed pertinent findings with patient on this date 2/21/2023 Assessment/Plan:     1. Post-surgical hypothyroidism  TSH 8.09 high on 11/22/2022  Following with Endocrinology  Continue for now Synthroid 112mcg QD     2. Tobacco use  Smoking cessation strongly encouraged  Patient wants to try on her own     3. Hordeolum externum of left lower eyelid  Warm compresses recommended  May require erythromycin ointment if she has discharge as patient manipulated the stye. Discussed hygiene      Care discussed with patient and questions answered. Patient verbalizes understanding and agrees with plan. Discussed with patient the importance of continuity of care. I encouraged patient to schedule next appointment within 3 months with me. Patient prefers to be reached by Phone call at [phone] for future medical correspondence. Encouraged to activate [employer]. I reviewed and reconciled the medications this visit. I reviewed and updated the past medical, surgical, social, and family history during this visit. After visit summary provided.        Pam Rodriges MD, MPH   Internal Medicine  2/21/2023   10:39 AM

## 2023-03-29 ENCOUNTER — OFFICE VISIT (OUTPATIENT)
Dept: INTERNAL MEDICINE CLINIC | Age: 48
End: 2023-03-29
Payer: COMMERCIAL

## 2023-03-29 VITALS
HEIGHT: 66 IN | BODY MASS INDEX: 37.45 KG/M2 | HEART RATE: 92 BPM | OXYGEN SATURATION: 98 % | TEMPERATURE: 97.3 F | WEIGHT: 233 LBS

## 2023-03-29 DIAGNOSIS — H66.90 ACUTE OTITIS MEDIA, UNSPECIFIED OTITIS MEDIA TYPE: Primary | ICD-10-CM

## 2023-03-29 DIAGNOSIS — E89.0 POST-SURGICAL HYPOTHYROIDISM: ICD-10-CM

## 2023-03-29 PROCEDURE — 99213 OFFICE O/P EST LOW 20 MIN: CPT | Performed by: PHYSICIAN ASSISTANT

## 2023-03-29 RX ORDER — AMOXICILLIN AND CLAVULANATE POTASSIUM 875; 125 MG/1; MG/1
1 TABLET, FILM COATED ORAL 2 TIMES DAILY
Qty: 20 TABLET | Refills: 0 | Status: SHIPPED | OUTPATIENT
Start: 2023-03-29 | End: 2023-04-08

## 2023-03-29 RX ORDER — LEVOTHYROXINE SODIUM 88 MCG
TABLET ORAL
Qty: 30 TABLET | Refills: 1 | OUTPATIENT
Start: 2023-03-29

## 2023-03-29 RX ORDER — CETIRIZINE HYDROCHLORIDE, PSEUDOEPHEDRINE HYDROCHLORIDE 5; 120 MG/1; MG/1
1 TABLET, FILM COATED, EXTENDED RELEASE ORAL 2 TIMES DAILY
Qty: 30 TABLET | Refills: 0 | Status: SHIPPED | OUTPATIENT
Start: 2023-03-29 | End: 2023-04-28

## 2023-03-29 ASSESSMENT — ENCOUNTER SYMPTOMS
COUGH: 0
SINUS PRESSURE: 1
WHEEZING: 0
VOMITING: 0
COLOR CHANGE: 0
DIARRHEA: 0
PHOTOPHOBIA: 0
NAUSEA: 0
BACK PAIN: 0
CHEST TIGHTNESS: 0
EYE PAIN: 0
BLOOD IN STOOL: 0
SHORTNESS OF BREATH: 0
CONSTIPATION: 0
SINUS PAIN: 1
EYE DISCHARGE: 0
EYE REDNESS: 0
RHINORRHEA: 0
ABDOMINAL PAIN: 0

## 2023-03-29 NOTE — PROGRESS NOTES
appetite change, chills, fatigue and fever. HENT:  Positive for congestion, ear pain, postnasal drip, sinus pressure and sinus pain. Negative for hearing loss and rhinorrhea. Eyes:  Negative for photophobia, pain, discharge and redness. Respiratory:  Negative for cough, chest tightness, shortness of breath and wheezing. Cardiovascular:  Negative for chest pain, palpitations and leg swelling. Gastrointestinal:  Negative for abdominal pain, blood in stool, constipation, diarrhea, nausea and vomiting. Endocrine: Negative for polyuria. Genitourinary:  Negative for difficulty urinating, dysuria, flank pain, frequency, hematuria and urgency. Musculoskeletal:  Negative for arthralgias, back pain, gait problem and joint swelling. Skin:  Negative for color change and rash. Neurological:  Negative for dizziness, syncope, weakness, light-headedness and headaches. Hematological:  Negative for adenopathy. Psychiatric/Behavioral:  Negative for agitation, behavioral problems and suicidal ideas. The patient is not nervous/anxious. Physical Exam  Constitutional:       General: She is not in acute distress. Appearance: She is obese. She is not ill-appearing, toxic-appearing or diaphoretic. HENT:      Head: Normocephalic and atraumatic. Right Ear: External ear normal.      Left Ear: External ear normal.      Ears:      Comments: Bilateral TMs are erythematous, bulging, purulent effusions noted behind TMs bilaterally, TMs intact, no mastoid swelling or tenderness     Nose: Congestion present. Mouth/Throat:      Pharynx: Posterior oropharyngeal erythema present. No oropharyngeal exudate. Eyes:      Extraocular Movements: Extraocular movements intact. Pupils: Pupils are equal, round, and reactive to light. Cardiovascular:      Rate and Rhythm: Regular rhythm. Pulses: Normal pulses. Pulmonary:      Effort: Pulmonary effort is normal. No respiratory distress.       Breath

## 2025-01-14 ENCOUNTER — OFFICE VISIT (OUTPATIENT)
Age: 50
End: 2025-01-14
Payer: COMMERCIAL

## 2025-01-14 VITALS
SYSTOLIC BLOOD PRESSURE: 136 MMHG | HEART RATE: 103 BPM | OXYGEN SATURATION: 99 % | DIASTOLIC BLOOD PRESSURE: 86 MMHG | BODY MASS INDEX: 37.45 KG/M2 | HEIGHT: 66 IN | WEIGHT: 233 LBS

## 2025-01-14 DIAGNOSIS — H69.91 DYSFUNCTION OF RIGHT EUSTACHIAN TUBE: ICD-10-CM

## 2025-01-14 DIAGNOSIS — S16.1XXA STRAIN OF NECK MUSCLE, INITIAL ENCOUNTER: Primary | ICD-10-CM

## 2025-01-14 DIAGNOSIS — A08.4 VIRAL GASTROENTERITIS: ICD-10-CM

## 2025-01-14 PROCEDURE — 99213 OFFICE O/P EST LOW 20 MIN: CPT | Performed by: PHYSICIAN ASSISTANT

## 2025-01-14 RX ORDER — ONDANSETRON 4 MG/1
4 TABLET, ORALLY DISINTEGRATING ORAL 3 TIMES DAILY PRN
Qty: 21 TABLET | Refills: 0 | Status: SHIPPED | OUTPATIENT
Start: 2025-01-14

## 2025-01-14 RX ORDER — CYCLOBENZAPRINE HCL 5 MG
5-10 TABLET ORAL NIGHTLY PRN
Qty: 30 TABLET | Refills: 0 | Status: SHIPPED | OUTPATIENT
Start: 2025-01-14 | End: 2025-01-29

## 2025-01-14 SDOH — ECONOMIC STABILITY: FOOD INSECURITY: WITHIN THE PAST 12 MONTHS, YOU WORRIED THAT YOUR FOOD WOULD RUN OUT BEFORE YOU GOT MONEY TO BUY MORE.: NEVER TRUE

## 2025-01-14 SDOH — ECONOMIC STABILITY: FOOD INSECURITY: WITHIN THE PAST 12 MONTHS, THE FOOD YOU BOUGHT JUST DIDN'T LAST AND YOU DIDN'T HAVE MONEY TO GET MORE.: NEVER TRUE

## 2025-01-14 ASSESSMENT — ENCOUNTER SYMPTOMS
SHORTNESS OF BREATH: 0
EYE DISCHARGE: 0
EYE REDNESS: 0
CONSTIPATION: 0
WHEEZING: 0
ABDOMINAL PAIN: 0
RHINORRHEA: 0
SORE THROAT: 0
NAUSEA: 1
CHEST TIGHTNESS: 0
VOMITING: 0
BLOOD IN STOOL: 0
COUGH: 0
BACK PAIN: 0
DIARRHEA: 1
EYE PAIN: 0
COLOR CHANGE: 0
PHOTOPHOBIA: 0

## 2025-01-14 NOTE — PROGRESS NOTES
Minda Holt (:  1975) is a 49 y.o. female,Established patient, here for evaluation of the following chief complaint(s):    Ear Pain, Diarrhea (Started yesterday), and Nausea & Vomiting        ASSESSMENT/PLAN:  Assessment & Plan  1. Viral gastroenteritis.  Symptoms suggest a viral gastroenteritis, which typically resolves within a few days. However, lingering fatigue and weakness may persist due to dehydration. A prescription for Zofran 4 mg will be provided, to be taken every 8 hours as needed for the next few days. They are advised to consume oral rehydration solutions such as Pedialyte or Liquid I.V., to aid in replenishment and rehydration. A diet of bland, easily digestible foods is recommended for the initial few days. Probiotics may also be beneficial in restoring gut kaylee. If symptoms persist beyond a week, they should return for further evaluation.    2. Right ear pain.  The presence of fluid behind the eardrum suggests a possible blockage in the eustachian tube or fluid accumulation in the middle ear space, potentially caused by vomiting-induced pressure changes. They are advised to perform gentle saline irrigation, followed by nasal blowing and Flonase application. Frequent chewing and warm salt water gargles may also help to open the eustachian tube. If they experience sharp, intense ear pain, fevers, or persistent symptoms, they should return for further evaluation.    3. Trapezius muscle strain.  Their symptoms are indicative of a trapezius muscle strain. They are advised to engage in stretching exercises, apply heat, and receive local massage therapy. A prescription for Flexeril 5 mg will be provided, to be taken 1 to 2 tablets nightly as needed. They may also take ibuprofen concurrently. A work note will be provided today, and [facility] paperwork can be completed upon request.      1. Strain of neck muscle, initial encounter  -     cyclobenzaprine (FLEXERIL) 5 MG tablet; Take 1-2 tablets by

## 2025-02-17 SDOH — HEALTH STABILITY: PHYSICAL HEALTH: ON AVERAGE, HOW MANY MINUTES DO YOU ENGAGE IN EXERCISE AT THIS LEVEL?: 0 MIN

## 2025-02-18 ENCOUNTER — OFFICE VISIT (OUTPATIENT)
Age: 50
End: 2025-02-18
Payer: COMMERCIAL

## 2025-02-18 VITALS
DIASTOLIC BLOOD PRESSURE: 78 MMHG | OXYGEN SATURATION: 98 % | BODY MASS INDEX: 40.09 KG/M2 | HEART RATE: 103 BPM | SYSTOLIC BLOOD PRESSURE: 138 MMHG | WEIGHT: 248.4 LBS | RESPIRATION RATE: 20 BRPM

## 2025-02-18 DIAGNOSIS — E66.813 CLASS 3 SEVERE OBESITY DUE TO EXCESS CALORIES WITHOUT SERIOUS COMORBIDITY WITH BODY MASS INDEX (BMI) OF 40.0 TO 44.9 IN ADULT: Primary | ICD-10-CM

## 2025-02-18 DIAGNOSIS — E89.0 POST-SURGICAL HYPOTHYROIDISM: ICD-10-CM

## 2025-02-18 DIAGNOSIS — J30.2 SEASONAL ALLERGIES: ICD-10-CM

## 2025-02-18 DIAGNOSIS — Z72.0 TOBACCO USE: ICD-10-CM

## 2025-02-18 DIAGNOSIS — E66.01 CLASS 3 SEVERE OBESITY DUE TO EXCESS CALORIES WITHOUT SERIOUS COMORBIDITY WITH BODY MASS INDEX (BMI) OF 40.0 TO 44.9 IN ADULT: Primary | ICD-10-CM

## 2025-02-18 PROCEDURE — G2211 COMPLEX E/M VISIT ADD ON: HCPCS

## 2025-02-18 PROCEDURE — 99204 OFFICE O/P NEW MOD 45 MIN: CPT

## 2025-02-18 SDOH — ECONOMIC STABILITY: FOOD INSECURITY: WITHIN THE PAST 12 MONTHS, THE FOOD YOU BOUGHT JUST DIDN'T LAST AND YOU DIDN'T HAVE MONEY TO GET MORE.: NEVER TRUE

## 2025-02-18 SDOH — ECONOMIC STABILITY: FOOD INSECURITY: WITHIN THE PAST 12 MONTHS, YOU WORRIED THAT YOUR FOOD WOULD RUN OUT BEFORE YOU GOT MONEY TO BUY MORE.: NEVER TRUE

## 2025-02-18 ASSESSMENT — PATIENT HEALTH QUESTIONNAIRE - PHQ9
SUM OF ALL RESPONSES TO PHQ QUESTIONS 1-9: 0
1. LITTLE INTEREST OR PLEASURE IN DOING THINGS: NOT AT ALL
2. FEELING DOWN, DEPRESSED OR HOPELESS: NOT AT ALL
SUM OF ALL RESPONSES TO PHQ QUESTIONS 1-9: 0

## 2025-02-18 NOTE — ASSESSMENT & PLAN NOTE
-Over all: -Uncontrolled  Wt Readings from Last 2 Encounters:   02/18/25 112.7 kg (248 lb 6.4 oz)   01/14/25 105.7 kg (233 lb)      -Change in energy: NA    -Original Clothing Size:    -Current Clothing Size: NA  -Contemplation phase of change  -Starting lifestyle goal  -Avoid high fructose corn syrup  -Reassess in 1 month  -Future goals  -No carbohydrates 2 hours before bedtime  -5 meals a day without exceeding daily calorie limit of 2500  -Minimal of 1500 calories a day  -At least 64 ounces of water daily  -6 to 8 hours of sleep nightly  -Start with 3 days of exercise for 30 minutes  -Incorporation of resistance training  -Walk 5 min after each meal  -Goals in maintenance:   -None currently\

## 2025-02-27 ENCOUNTER — COMMUNITY OUTREACH (OUTPATIENT)
Age: 50
End: 2025-02-27

## 2025-03-19 PROBLEM — J30.2 SEASONAL ALLERGIES: Status: ACTIVE | Noted: 2025-03-19

## 2025-03-25 ENCOUNTER — OFFICE VISIT (OUTPATIENT)
Age: 50
End: 2025-03-25
Payer: COMMERCIAL

## 2025-03-25 VITALS
SYSTOLIC BLOOD PRESSURE: 130 MMHG | RESPIRATION RATE: 20 BRPM | BODY MASS INDEX: 40 KG/M2 | DIASTOLIC BLOOD PRESSURE: 88 MMHG | WEIGHT: 247.8 LBS | HEART RATE: 101 BPM | OXYGEN SATURATION: 96 %

## 2025-03-25 DIAGNOSIS — E89.0 POST-SURGICAL HYPOTHYROIDISM: ICD-10-CM

## 2025-03-25 DIAGNOSIS — E66.01 CLASS 3 SEVERE OBESITY DUE TO EXCESS CALORIES WITHOUT SERIOUS COMORBIDITY WITH BODY MASS INDEX (BMI) OF 40.0 TO 44.9 IN ADULT: Primary | ICD-10-CM

## 2025-03-25 DIAGNOSIS — Z72.0 TOBACCO USE: ICD-10-CM

## 2025-03-25 DIAGNOSIS — E66.813 CLASS 3 SEVERE OBESITY DUE TO EXCESS CALORIES WITHOUT SERIOUS COMORBIDITY WITH BODY MASS INDEX (BMI) OF 40.0 TO 44.9 IN ADULT: Primary | ICD-10-CM

## 2025-03-25 PROCEDURE — G2211 COMPLEX E/M VISIT ADD ON: HCPCS

## 2025-03-25 PROCEDURE — 99214 OFFICE O/P EST MOD 30 MIN: CPT

## 2025-03-25 NOTE — ASSESSMENT & PLAN NOTE
-Over all: -Uncontrolled  Wt Readings from Last 2 Encounters:   03/25/25 112.4 kg (247 lb 12.8 oz)   02/18/25 112.7 kg (248 lb 6.4 oz)    -Change in energy: \"About the same\"    -Original Clothing Size: Shirt XXL, Pant 18   -Current Clothing Size: No change  -Contemplation phase of change  -Starting lifestyle goal  -Avoid high fructose corn syrup  -Reassess in 1 month  -Future goals  -No carbohydrates 2 hours before bedtime  -5 meals a day without exceeding daily calorie limit of 2500  -Minimal of 1500 calories a day  -At least 64 ounces of water daily  -6 to 8 hours of sleep nightly  -Start with 3 days of exercise for 30 minutes  -Incorporation of resistance training  -Walk 5 min after each meal  -Goals in maintenance:   -None currently

## 2025-03-25 NOTE — PROGRESS NOTES
[facility]  [address]  [facility] [address]  Dept: [phone]  Dept Fax: [phone]  Loc: [phone]      Visit type: Established patient    Encounter Start Time: 4:14 PM EDT  Encounter End Time: 4:43 PM EDT     100% of the time was spent with the patient today, discussing their symptoms, conducting an examination, reviewing the patient's diagnostic test results, and counseling    Reason for Visit: Weight Management (4wk follow up pt. Would like lab work since endocrinologist moved)      Assessment and Plan         Assessment & Plan  Weight management  Controlled  - She has been counseled on the importance of reducing soda intake and incorporating more water into her diet.  - The potential benefits of sparkling water as a substitute for soda have been discussed.  - She has been advised to monitor her thyroid levels and adjust her medication dosage accordingly.  Treatment plan:  - She has been counseled on the importance of reducing soda intake and incorporating more water into her diet.  - The potential benefits of sparkling water as a substitute for soda have been discussed.  Future Plan:  - If symptoms persist despite normal thyroid levels, further evaluation will be necessary.      Smoking cessation  Improving  - She has made progress in reducing her smoking, cutting down by nearly half a pack.  Dx plan:  - She expressed interest in using a nicotine patch to further reduce smoking.  Treatment plan:  - The potential side effects of the patch were discussed, and she was advised to try it again.  - Continued support and encouragement for smoking cessation were provided.  Future Plan:  - If symptoms persist despite normal thyroid levels, further evaluation will be necessary.      Thyroid management  Controlled  - She is currently taking 150 mcg of levothyroxine for 6 days and half a tablet on Sunday.  Dx plan:  - A TSH test will be conducted to assess her current

## 2025-03-26 ENCOUNTER — CLINICAL SUPPORT (OUTPATIENT)
Age: 50
End: 2025-03-26
Payer: COMMERCIAL

## 2025-03-26 DIAGNOSIS — E89.0 POST-SURGICAL HYPOTHYROIDISM: ICD-10-CM

## 2025-03-26 PROCEDURE — 36415 COLL VENOUS BLD VENIPUNCTURE: CPT

## 2025-03-27 LAB — TSH SERPL DL<=0.005 MIU/L-ACNC: 0.52 UIU/ML (ref 0.27–4.2)

## 2025-04-01 ENCOUNTER — RESULTS FOLLOW-UP (OUTPATIENT)
Age: 50
End: 2025-04-01

## 2025-04-01 DIAGNOSIS — E89.0 POST-SURGICAL HYPOTHYROIDISM: Primary | ICD-10-CM

## 2025-04-02 RX ORDER — LEVOTHYROXINE SODIUM 150 UG/1
150 TABLET ORAL DAILY
Qty: 90 TABLET | Refills: 0 | Status: SHIPPED | OUTPATIENT
Start: 2025-04-02 | End: 2025-07-01

## 2025-05-05 ENCOUNTER — OFFICE VISIT (OUTPATIENT)
Age: 50
End: 2025-05-05
Payer: COMMERCIAL

## 2025-05-05 VITALS
RESPIRATION RATE: 18 BRPM | OXYGEN SATURATION: 98 % | DIASTOLIC BLOOD PRESSURE: 78 MMHG | WEIGHT: 247.6 LBS | HEART RATE: 78 BPM | SYSTOLIC BLOOD PRESSURE: 130 MMHG | BODY MASS INDEX: 39.96 KG/M2

## 2025-05-05 DIAGNOSIS — Z12.4 CERVICAL CANCER SCREENING: ICD-10-CM

## 2025-05-05 DIAGNOSIS — Z12.31 SCREENING MAMMOGRAM FOR BREAST CANCER: ICD-10-CM

## 2025-05-05 DIAGNOSIS — Z72.0 TOBACCO USE: ICD-10-CM

## 2025-05-05 DIAGNOSIS — E66.813 CLASS 3 SEVERE OBESITY DUE TO EXCESS CALORIES WITHOUT SERIOUS COMORBIDITY WITH BODY MASS INDEX (BMI) OF 40.0 TO 44.9 IN ADULT (HCC): Primary | ICD-10-CM

## 2025-05-05 DIAGNOSIS — Z23 NEED FOR DIPHTHERIA-TETANUS-PERTUSSIS (TDAP) VACCINE: ICD-10-CM

## 2025-05-05 DIAGNOSIS — E89.0 POST-SURGICAL HYPOTHYROIDISM: ICD-10-CM

## 2025-05-05 PROCEDURE — 99429 UNLISTED PREVENTIVE SERVICE: CPT

## 2025-05-05 PROCEDURE — 90471 IMMUNIZATION ADMIN: CPT

## 2025-05-05 PROCEDURE — 99214 OFFICE O/P EST MOD 30 MIN: CPT

## 2025-05-05 PROCEDURE — 90715 TDAP VACCINE 7 YRS/> IM: CPT

## 2025-05-05 RX ORDER — LEVOTHYROXINE SODIUM 150 UG/1
150 TABLET ORAL DAILY
Qty: 90 TABLET | Refills: 0 | Status: SHIPPED | OUTPATIENT
Start: 2025-05-05 | End: 2025-08-03

## 2025-05-05 NOTE — ASSESSMENT & PLAN NOTE
-Over all: -Uncontrolled  Wt Readings from Last 2 Encounters:   05/05/25 112.3 kg (247 lb 9.6 oz)   03/25/25 112.4 kg (247 lb 12.8 oz)    -Change in energy: \"About the same\"    -Original Clothing Size: Shirt XXL, Pant 18   -Current Clothing Size: No change  -Contemplation phase of change  -Starting lifestyle goal  -Avoid high fructose corn syrup  -Reassess in 1 month  -Future goals  -No carbohydrates 2 hours before bedtime  -5 meals a day without exceeding daily calorie limit of 2500  -Minimal of 1500 calories a day  -6 to 8 hours of sleep nightly  -Start with 3 days of exercise for 30 minutes  -Incorporation of resistance training  -Walk 5 min after each meal  -Goals in maintenance:   -None currently

## 2025-05-05 NOTE — PROGRESS NOTES
[facility]  [address]  [facility] [address]  Dept: [phone]  Dept Fax: [phone]  Loc: [phone]      Visit type: Established patient    Encounter Start Time: 9:03 AM EDT  Encounter End Time: 9:47 AM EDT     100% of the time was spent with the patient today, discussing their symptoms, conducting an examination, reviewing the patient's diagnostic test results, and counseling    Reason for Visit: Weight Loss    Assessment and Plan       Assessment & Plan  Weight management  Stable  Improving  TREATMENT:   - Gradually reduce soda intake, aiming for complete cessation, while maintaining tea consumption.  - Daily water intake of at least 64 ounces.  - [place]on with different brands of carbonated water, adding flavor as desired.  - Incorporate chicken liver into diet.  FUTURE PLANS:   - Follow up in 2 months.    Thyroid management  Stable  No Change  TREATMENT:   - Continue current medication regimen.  - 90-day supply of thyroid medication called into pharmacy.  MONITORING STUDIES:   - Lab work during next visit.  FUTURE PLANS:   - Follow up in 2 months.    Smoking cessation  Improving  Improving  TREATMENT:   - Use nicotine patch for a minimum of 1 week, and if cravings persist, use it every other day for an additional 1 to 2 weeks before discontinuing.  - Consider Wellbutrin for stress relief and smoking cessation if needed.  FUTURE PLANS:   - Follow up in 2 months.    Health maintenance  Stable  No Change  TREATMENT:   - Receive tetanus vaccine.  - Regular screenings and vaccinations.  - Schedule appointment with gynecologist available at facility on Wednesdays and Thursdays.  - Mammogram ordered.  FUTURE PLANS:   - Follow up in 2 months.          Patient was involved and agreeable in shared decision making.  Information about different treatment options including side effects discussed with patient.  Any information requested was supplied.    Return in about 8

## 2025-05-29 ENCOUNTER — INITIAL CONSULT (OUTPATIENT)
Age: 50
End: 2025-05-29

## 2025-05-29 VITALS
WEIGHT: 247.4 LBS | SYSTOLIC BLOOD PRESSURE: 136 MMHG | DIASTOLIC BLOOD PRESSURE: 75 MMHG | BODY MASS INDEX: 39.76 KG/M2 | HEIGHT: 66 IN | HEART RATE: 106 BPM

## 2025-05-29 DIAGNOSIS — L73.2 HIDRADENITIS SUPPURATIVA: ICD-10-CM

## 2025-05-29 DIAGNOSIS — Z12.4 CERVICAL CANCER SCREENING: ICD-10-CM

## 2025-05-29 DIAGNOSIS — N84.1 CERVICAL POLYP: ICD-10-CM

## 2025-05-29 DIAGNOSIS — Z01.419 WELL WOMAN EXAM WITH ROUTINE GYNECOLOGICAL EXAM: Primary | ICD-10-CM

## 2025-05-29 RX ORDER — DOXYCYCLINE HYCLATE 100 MG
100 TABLET ORAL 2 TIMES DAILY
Qty: 60 TABLET | Refills: 2 | Status: SHIPPED | OUTPATIENT
Start: 2025-05-29 | End: 2025-08-27

## 2025-05-29 RX ORDER — MULTIVIT WITH MINERALS/LUTEIN
250 TABLET ORAL DAILY
COMMUNITY

## 2025-05-29 RX ORDER — MULTIVIT-MIN/IRON/FOLIC ACID/K 18-600-40
2000 CAPSULE ORAL DAILY
COMMUNITY

## 2025-05-29 NOTE — PROGRESS NOTES
Department of Obstetrics and Gynecology  Well Woman Office Visit  Name:  Minda Holt   CSN: [id number]    : 1975   Age: 50 y.o.      Chief Complaint   Patient presents with    Consultation     Pt referral from Traci Bronson MD for routine exam.     Annual Exam     Annual exam. Smoker . No known h/o dvt. Patient is still menstruating @LMP@  regular birth control type not on birth control. Patient is sexually active. Pap Smear was   .  Patient has never had a mammogram. Patient has not had previous bone density scan. [place]ard x 2-3 yrs.   Patient complains of boil on left thigh.         SUBJECTIVE:  Minda Holt is a 50 y.o.  who presents for a routine well woman exam. Notes boil on left inguinal area. Notes she has a history of getting these. Thinks this is from friction from clothing.       GYN HX:    Menses:   Patient's last menstrual period was 2025.  Periods are irregular.   Cervical cancer screening:  Last pap smear .  No history of abnormal pap smears.  Breast cancer screening:   Has not had  Contraception:  None.  Sexual history:  Currently is sexually active.  Denies issues of sexual dysfunction.  Denies current or past issues with physical, emotional, or sexual abuse.  Maternal aunts breast cancer     Patient's medications, allergies, past medical, surgical, social and family histories were reviewed and updated as appropriate.    Past Medical History:   Diagnosis Date    Anxiety     Hypothyroidism     Complete Thyroid removal     Irregular menses     Menorrhagia     Obesity     Seasonal allergies     Toxic multinodular goiter     s/p total thyroidectomy        Past Surgical History:   Procedure Laterality Date     SECTION   &     TOTAL THYROIDECTOMY         Social History     Socioeconomic History    Marital status: Single     Spouse name: Not on file    Number of children: Not on file    Years of education: Not on file    Highest education